# Patient Record
Sex: FEMALE | Race: WHITE | NOT HISPANIC OR LATINO | Employment: OTHER | URBAN - METROPOLITAN AREA
[De-identification: names, ages, dates, MRNs, and addresses within clinical notes are randomized per-mention and may not be internally consistent; named-entity substitution may affect disease eponyms.]

---

## 2023-02-09 PROBLEM — M99.09 SEGMENTAL AND SOMATIC DYSFUNCTION: Status: ACTIVE | Noted: 2023-02-09

## 2023-02-09 PROBLEM — E03.9 HYPOTHYROIDISM: Status: ACTIVE | Noted: 2023-02-09

## 2023-02-09 PROBLEM — E55.9 VITAMIN D DEFICIENCY: Status: ACTIVE | Noted: 2023-02-09

## 2023-02-09 PROBLEM — M54.2 DORSALGIA OF CERVICOTHORACIC REGION: Status: ACTIVE | Noted: 2023-02-09

## 2023-02-09 PROBLEM — H66.005 RECURRENT ACUTE SUPPURATIVE OTITIS MEDIA WITHOUT SPONTANEOUS RUPTURE OF LEFT TYMPANIC MEMBRANE: Status: ACTIVE | Noted: 2023-02-09

## 2023-02-09 PROBLEM — R39.9 SYMPTOMS INVOLVING URINARY SYSTEM: Status: ACTIVE | Noted: 2023-02-09

## 2023-02-09 PROBLEM — R25.2 MUSCLE CRAMPS: Status: ACTIVE | Noted: 2023-02-09

## 2023-02-09 PROBLEM — M54.31 SCIATICA OF RIGHT SIDE: Status: ACTIVE | Noted: 2023-02-09

## 2023-02-09 PROBLEM — R53.83 MALAISE AND FATIGUE: Status: ACTIVE | Noted: 2023-02-09

## 2023-02-09 PROBLEM — M53.3 SACROILIAC DYSFUNCTION: Status: ACTIVE | Noted: 2023-02-09

## 2023-02-09 PROBLEM — J01.10 ACUTE NON-RECURRENT FRONTAL SINUSITIS: Status: ACTIVE | Noted: 2023-02-09

## 2023-02-09 PROBLEM — R19.06 EPIGASTRIC MASS: Status: ACTIVE | Noted: 2023-02-09

## 2023-02-09 PROBLEM — R53.81 MALAISE AND FATIGUE: Status: ACTIVE | Noted: 2023-02-09

## 2023-02-09 PROBLEM — M54.6 DORSALGIA OF CERVICOTHORACIC REGION: Status: ACTIVE | Noted: 2023-02-09

## 2023-03-27 ENCOUNTER — TELEPHONE (OUTPATIENT)
Dept: PRIMARY CARE | Facility: CLINIC | Age: 73
End: 2023-03-27
Payer: COMMERCIAL

## 2023-04-03 ENCOUNTER — OFFICE VISIT (OUTPATIENT)
Dept: PRIMARY CARE | Facility: CLINIC | Age: 73
End: 2023-04-03
Payer: COMMERCIAL

## 2023-04-03 VITALS
WEIGHT: 126 LBS | SYSTOLIC BLOOD PRESSURE: 112 MMHG | DIASTOLIC BLOOD PRESSURE: 68 MMHG | TEMPERATURE: 97.4 F | HEART RATE: 65 BPM | OXYGEN SATURATION: 95 % | HEIGHT: 64 IN | BODY MASS INDEX: 21.51 KG/M2

## 2023-04-03 DIAGNOSIS — Z98.890 S/P BUNIONECTOMY: ICD-10-CM

## 2023-04-03 DIAGNOSIS — E03.9 HYPOTHYROIDISM, UNSPECIFIED TYPE: Primary | ICD-10-CM

## 2023-04-03 PROCEDURE — 99213 OFFICE O/P EST LOW 20 MIN: CPT | Performed by: FAMILY MEDICINE

## 2023-04-03 PROCEDURE — 1159F MED LIST DOCD IN RCRD: CPT | Performed by: FAMILY MEDICINE

## 2023-04-03 ASSESSMENT — PATIENT HEALTH QUESTIONNAIRE - PHQ9
SUM OF ALL RESPONSES TO PHQ9 QUESTIONS 1 AND 2: 0
2. FEELING DOWN, DEPRESSED OR HOPELESS: NOT AT ALL
1. LITTLE INTEREST OR PLEASURE IN DOING THINGS: NOT AT ALL

## 2023-04-03 NOTE — PROGRESS NOTES
Subjective   Patient ID: Hayley Morales is a 73 y.o. female who presents for Follow-up (Follow up on thyroid and check her right foot had a bunion taken off).    HPI  Having issues with the right foot.  Had bunionectomy and had fusion.  13 weeks out from surgery she is still swelling and burning.  Not stable. She is getting Therapy.  Not quickly enough.   broke his leg.  She is just back to work.  Ice and elevate.  When she has swelling it burns.        Review of Systems  No other complaints  Objective   Physical Exam  General: Patient is alert and oriented; no acute distress    Eyes: EOMI, PERRLA    Neck: No adenopathy, thyroid is palpable, normal size, no nodules    Heart: Regular rate and rhythm no murmurs clicks gallops    Lungs: Clear to auscultation bilateral without [place] wheezing    Extremities: No cyanosis clubbing or edema.  Well-healing scar on the right foot where the bunionectomy was done.  Clean dry and intact.  Assessment/Plan   Problem List Items Addressed This Visit    None  Bunionectomy  - Patient is healing well.  Instructed her to continue with current treatment  - Suggested that she try [employer]    Received her thyroid labs were normal let the patient know that there is any changes.  Otherwise she will follow-up in 6 months or as needed.

## 2023-04-06 ENCOUNTER — TELEPHONE (OUTPATIENT)
Dept: PRIMARY CARE | Facility: CLINIC | Age: 73
End: 2023-04-06
Payer: COMMERCIAL

## 2023-04-06 NOTE — TELEPHONE ENCOUNTER
Per Dr. Saldana labs done 3/30/23 let pt know overall results are normal. Would suggest to take selenium and zinc (OTC) to help with thyroid conversion of t4 to t3.

## 2023-04-06 NOTE — TELEPHONE ENCOUNTER
Called Hayley and let her know. DO YOU JUST WANT HER TO TAKE THOSE 1X DAILY OR DIFFERENTLY? SHE ASLO SAID SHE GOT [employer] AND ITS HELPING WITH THE SWELLING.

## 2023-09-11 ENCOUNTER — TELEPHONE (OUTPATIENT)
Dept: PRIMARY CARE | Facility: CLINIC | Age: 73
End: 2023-09-11
Payer: COMMERCIAL

## 2023-09-11 DIAGNOSIS — E55.9 VITAMIN D DEFICIENCY: ICD-10-CM

## 2023-09-11 DIAGNOSIS — R53.81 MALAISE AND FATIGUE: ICD-10-CM

## 2023-09-11 DIAGNOSIS — E03.9 HYPOTHYROIDISM, UNSPECIFIED TYPE: Primary | ICD-10-CM

## 2023-09-11 DIAGNOSIS — R53.83 MALAISE AND FATIGUE: ICD-10-CM

## 2023-10-02 ENCOUNTER — OFFICE VISIT (OUTPATIENT)
Dept: PRIMARY CARE | Facility: CLINIC | Age: 73
End: 2023-10-02
Payer: MEDICARE

## 2023-10-02 VITALS
BODY MASS INDEX: 19.81 KG/M2 | HEART RATE: 59 BPM | OXYGEN SATURATION: 99 % | HEIGHT: 64 IN | TEMPERATURE: 97.4 F | SYSTOLIC BLOOD PRESSURE: 128 MMHG | DIASTOLIC BLOOD PRESSURE: 66 MMHG | WEIGHT: 116 LBS

## 2023-10-02 DIAGNOSIS — Z12.11 COLON CANCER SCREENING: ICD-10-CM

## 2023-10-02 DIAGNOSIS — Z12.31 ENCOUNTER FOR SCREENING MAMMOGRAM FOR MALIGNANT NEOPLASM OF BREAST: ICD-10-CM

## 2023-10-02 DIAGNOSIS — Z00.00 WELL ADULT EXAM: Primary | ICD-10-CM

## 2023-10-02 PROCEDURE — 99397 PER PM REEVAL EST PAT 65+ YR: CPT | Performed by: FAMILY MEDICINE

## 2023-10-02 PROCEDURE — 1036F TOBACCO NON-USER: CPT | Performed by: FAMILY MEDICINE

## 2023-10-02 PROCEDURE — 1159F MED LIST DOCD IN RCRD: CPT | Performed by: FAMILY MEDICINE

## 2023-10-02 RX ORDER — FLUTICASONE PROPIONATE 50 MCG
SPRAY, SUSPENSION (ML) NASAL
COMMUNITY

## 2023-10-02 RX ORDER — ERGOCALCIFEROL 1.25 MG/1
CAPSULE ORAL
COMMUNITY

## 2023-10-02 ASSESSMENT — PATIENT HEALTH QUESTIONNAIRE - PHQ9
1. LITTLE INTEREST OR PLEASURE IN DOING THINGS: NOT AT ALL
2. FEELING DOWN, DEPRESSED OR HOPELESS: NOT AT ALL
SUM OF ALL RESPONSES TO PHQ9 QUESTIONS 1 AND 2: 0

## 2023-10-02 NOTE — PROGRESS NOTES
Subjective   Patient ID: Hayley Morales is a 73 y.o. female who presents for Follow-up (Right foot had surgery on it).  Well exam  HPI  Right foot is not doing well. She is still having pain.  It is buring all the time.      Lifestyle: She consumes a diverse and healthy diet. She exercises regularly. She does not use tobacco. She denies alcohol use.   Reproductive health: the patient is postmenopausal.   Cervical cancer screening: screening not indicated.   Breast cancer screening: cancer screening reviewed and current.   Colorectal Cancer Screening: colonoscopy ordered and cologuard neg 10/2021.   Metabolic screening: lipid profile performed within the past five years.   Review of Systems    Objective   Physical Exam  General: Patient is alert and oriented Ã--3 and appears in no acute distress. No respiratory distress.     Head: Atraumatic normocephalic.     Eyes: EOMI, PERRLA      Ears: Canals patent without any irritation, tympanic membranes without inflammation, no swelling, normal light reflex.     Nose: Nares patent. Turbinates are not swollen. No discharge.     Mouth: Normal mucosa. Moist. No erythema, exudates, tonsillar enlargement.     Neck: Normal range of motion, no masses. Thyroid is palpable and normal in size without any nodules. No anterior cervical or posterior cervical adenopathy.     Heart: Regular rate and rhythm, no murmurs clicks or gallops     Lungs: Clear to auscultation bilaterally without any rhonchi rales or wheezing, lung sounds heard throughout all lung fields  Assessment/Plan   Problem List Items Addressed This Visit    None  Visit Diagnoses       Well adult exam    -  Primary    Encounter for screening mammogram for malignant neoplasm of breast            Reviewed in for the patient's past medical history, surgical history, family history, social history  Depression screening was done in the office today using PHQ-2  We reviewed the patient's activities of daily living and possible  risk for falling. Patient is stable.  Discussed preventative screenings  Vaccinations were discussed in the office. We did review the patient's status on influenza vaccination, pneumonia vaccination, tetanus vaccination, and refuses  Patient was instructed to follow-up with dentist regularly and also with eye doctor regularly  We discussed advanced directives including power of , living well and DO NOT RESUSCITATE orders     hypothyroidism  - Controlled on Tirosint    Risk Factors Identified During Visit: None.   Influenza: the patient declines the influenza vaccination.   Pneumovax 23: the patient declines the Pneumovax vaccination.   Prevnar 13: the patient declines the Prevnar 13 vaccination.   Shingles Vaccine: the patient declines the Shingles vaccination.   Breast cancer screening: screening ordered.   Cervical cancer screening: screening not indicated.   Osteoporosis screening: screening ordered.   Colorectal cancer screening: cologuaambar 10/2021 neg  HIV screening: screening not indicated.

## 2023-10-23 DIAGNOSIS — E03.9 HYPOTHYROIDISM, UNSPECIFIED TYPE: Primary | ICD-10-CM

## 2023-10-23 RX ORDER — LEVOTHYROXINE SODIUM 75 UG/1
75 CAPSULE ORAL
Qty: 90 CAPSULE | Refills: 3 | Status: SHIPPED | OUTPATIENT
Start: 2023-10-23 | End: 2024-01-31 | Stop reason: SDUPTHER

## 2024-01-24 ENCOUNTER — TELEPHONE (OUTPATIENT)
Dept: PRIMARY CARE | Facility: CLINIC | Age: 74
End: 2024-01-24
Payer: COMMERCIAL

## 2024-01-31 ENCOUNTER — OFFICE VISIT (OUTPATIENT)
Dept: PRIMARY CARE | Facility: CLINIC | Age: 74
End: 2024-01-31
Payer: MEDICARE

## 2024-01-31 VITALS
WEIGHT: 116 LBS | HEIGHT: 64 IN | DIASTOLIC BLOOD PRESSURE: 62 MMHG | HEART RATE: 68 BPM | SYSTOLIC BLOOD PRESSURE: 106 MMHG | OXYGEN SATURATION: 98 % | RESPIRATION RATE: 16 BRPM | BODY MASS INDEX: 19.81 KG/M2

## 2024-01-31 DIAGNOSIS — J01.00 ACUTE NON-RECURRENT MAXILLARY SINUSITIS: Primary | ICD-10-CM

## 2024-01-31 DIAGNOSIS — E03.9 HYPOTHYROIDISM, UNSPECIFIED TYPE: ICD-10-CM

## 2024-01-31 PROBLEM — L57.0 SENILE HYPERKERATOSIS: Status: ACTIVE | Noted: 2024-01-31

## 2024-01-31 PROBLEM — D49.2 NEOPLASM OF SOFT TISSUE: Status: ACTIVE | Noted: 2024-01-31

## 2024-01-31 PROBLEM — B02.9 HERPES ZOSTER: Status: ACTIVE | Noted: 2024-01-31

## 2024-01-31 PROCEDURE — 1160F RVW MEDS BY RX/DR IN RCRD: CPT | Performed by: FAMILY MEDICINE

## 2024-01-31 PROCEDURE — 1159F MED LIST DOCD IN RCRD: CPT | Performed by: FAMILY MEDICINE

## 2024-01-31 PROCEDURE — 99213 OFFICE O/P EST LOW 20 MIN: CPT | Performed by: FAMILY MEDICINE

## 2024-01-31 PROCEDURE — 1036F TOBACCO NON-USER: CPT | Performed by: FAMILY MEDICINE

## 2024-01-31 RX ORDER — LEVOTHYROXINE SODIUM 75 UG/1
75 CAPSULE ORAL
Qty: 90 CAPSULE | Refills: 3 | Status: SHIPPED | OUTPATIENT
Start: 2024-01-31 | End: 2024-02-21 | Stop reason: SDUPTHER

## 2024-01-31 RX ORDER — AMOXICILLIN 875 MG/1
875 TABLET, FILM COATED ORAL 2 TIMES DAILY
Qty: 14 TABLET | Refills: 0 | Status: SHIPPED | OUTPATIENT
Start: 2024-01-31 | End: 2024-02-07

## 2024-01-31 NOTE — PROGRESS NOTES
"Subjective   Patient ID: Hayley Morales is a 73 y.o. female who presents for Cough (Congestion, fatigue).  HPI    Using saline rinses, allibiotic, vinegar and salt water gargles.  Mucinex    Hayley Morales is a 73 y.o. female here for evaluation of a cough. The cough is non-productive, without wheezing, dyspnea or hemoptysis and is aggravated by nothing. Onset of symptoms was 7 days ago, unchanged since that time. Associated symptoms include sputum production. Patient does not have a history of asthma. Patient has not had recent travel. Patient does not have a history of smoking. Patient has not had a previous chest x-ray. Patient has not had a PPD done.    Review of Systems     Objective   /62 (BP Location: Right arm, Patient Position: Sitting, BP Cuff Size: Adult)   Pulse 68   Resp 16   Ht 1.613 m (5' 3.5\")   Wt 52.6 kg (116 lb)   SpO2 98%   BMI 20.23 kg/m²      Physical Exam  General: Patient is alert and oriented ×3 and appears in no acute distress. No respiratory distress.  Appears sick    Head: Insert sick exam insertmaxillary sinus tenderness and frontal sinus tenderness    Eyes: EOMI, PERRLA.  No conjunctival injection    Ears: Canals patent without any irritation, tympanic membranes without inflammation, no swelling, normal light reflex.    Nose: Nares patent. Turbinates are swollen.  Clear discharge.    Mouth: Normal mucosa. Moist. No erythema, exudates, tonsillar enlargement.,  Drainage down the posterior pharynx    Neck: decreased range of motion, no masses.  No anterior cervical or posterior cervical adenopathy.    Heart: Regular rate and rhythm, no murmurs clicks or gallops    Lungs: Clear to auscultation bilaterally without any rhonchi rales or wheezing, lung sounds heard throughout all lung fields    Abdomen: Soft, nontender, no rigidity, rebound, guarding or organomegaly. Bowel sounds ×4 quadrants.    Skin: Intact, dry, no rashes or erythema    Assessment/Plan   Acute " Bronchitis/acute maxillary sinus tenderness    Explained lack of efficacy of antibiotics in viral disease.  Antitussives per medication orders.  Avoid exposure to tobacco smoke and fumes.  B-agonist inhaler.  Call if shortness of breath worsens, blood in sputum, change in character of cough, development of fever or chills, inability to maintain nutrition and hydration. Avoid exposure to tobacco smoke and fumes.          Drosera 30 C

## 2024-02-07 ENCOUNTER — OFFICE VISIT (OUTPATIENT)
Dept: PRIMARY CARE | Facility: CLINIC | Age: 74
End: 2024-02-07
Payer: COMMERCIAL

## 2024-02-07 VITALS
OXYGEN SATURATION: 96 % | HEART RATE: 56 BPM | WEIGHT: 116 LBS | BODY MASS INDEX: 19.81 KG/M2 | HEIGHT: 64 IN | SYSTOLIC BLOOD PRESSURE: 102 MMHG | DIASTOLIC BLOOD PRESSURE: 62 MMHG

## 2024-02-07 DIAGNOSIS — R05.9 COUGH, UNSPECIFIED TYPE: ICD-10-CM

## 2024-02-07 DIAGNOSIS — J01.00 ACUTE NON-RECURRENT MAXILLARY SINUSITIS: Primary | ICD-10-CM

## 2024-02-07 LAB
POC RAPID INFLUENZA A: NEGATIVE
POC RAPID INFLUENZA B: NEGATIVE

## 2024-02-07 PROCEDURE — 87804 INFLUENZA ASSAY W/OPTIC: CPT | Performed by: FAMILY MEDICINE

## 2024-02-07 PROCEDURE — 1159F MED LIST DOCD IN RCRD: CPT | Performed by: FAMILY MEDICINE

## 2024-02-07 PROCEDURE — 99213 OFFICE O/P EST LOW 20 MIN: CPT | Performed by: FAMILY MEDICINE

## 2024-02-07 PROCEDURE — 1036F TOBACCO NON-USER: CPT | Performed by: FAMILY MEDICINE

## 2024-02-07 PROCEDURE — 1160F RVW MEDS BY RX/DR IN RCRD: CPT | Performed by: FAMILY MEDICINE

## 2024-02-07 RX ORDER — AZITHROMYCIN 250 MG/1
TABLET, FILM COATED ORAL
Qty: 6 TABLET | Refills: 0 | Status: SHIPPED | OUTPATIENT
Start: 2024-02-07 | End: 2024-02-12

## 2024-02-07 RX ORDER — METHYLPREDNISOLONE 4 MG/1
TABLET ORAL
Qty: 21 TABLET | Refills: 0 | Status: SHIPPED | OUTPATIENT
Start: 2024-02-07 | End: 2024-02-14

## 2024-02-07 NOTE — PROGRESS NOTES
Subjective   Patient ID: Hayley Morales is a 73 y.o. female who presents for Cough (Ear pressure, finished amoxicillin yesterday).  HPI  Patient finished the amoxicillin.  Yesterday she coughed until she threw up.  She also has issues with her left ear being full.  She has had ruptured ear drum on the left in the past.    Review of Systems    Objective   Physical Exam  General: Patient is alert and orient x 3 appears in no acute distress.  She does appear sick and is wearing a mask    Head: Patient has pain to palpation of maxillary sinuses that is severe    Neck: No adenopathy    Heart: Regular rate and rhythm no murmurs clicks or gallops    Lungs: Clear to auscultation bilateral without [place] wheezing    Extremities: No cyanosis clubbing or edema      Assessment/Plan   Problem List Items Addressed This Visit    None  Visit Diagnoses       Acute non-recurrent maxillary sinusitis    -  Primary    Cough, unspecified type        Relevant Orders    POCT Influenza A/B manually resulted (Completed)          Instructed to take Bromaline 1000 mg up to 3 times a day with nothing to eat 1 hour before or after  Start Mucinex DM again  Make sure you are drinking plenty of water  Retreating with steroid and azithromycin  Call if symptoms or not improving  Make sure to take probiotics

## 2024-02-21 ENCOUNTER — TELEPHONE (OUTPATIENT)
Dept: PRIMARY CARE | Facility: CLINIC | Age: 74
End: 2024-02-21
Payer: COMMERCIAL

## 2024-02-21 DIAGNOSIS — E03.9 HYPOTHYROIDISM, UNSPECIFIED TYPE: ICD-10-CM

## 2024-02-21 RX ORDER — LEVOTHYROXINE SODIUM 75 UG/1
75 CAPSULE ORAL
Qty: 90 CAPSULE | Refills: 3 | Status: SHIPPED | OUTPATIENT
Start: 2024-02-21 | End: 2024-03-05 | Stop reason: SDUPTHER

## 2024-02-21 NOTE — TELEPHONE ENCOUNTER
Sharon called and said her 3 granddaughter that she watches have been exposed to chicken pox and she has had shingles. She is wanting to know if she can get them again or should she be concerned about it.

## 2024-03-05 DIAGNOSIS — E03.9 HYPOTHYROIDISM, UNSPECIFIED TYPE: ICD-10-CM

## 2024-03-05 RX ORDER — LEVOTHYROXINE SODIUM 75 UG/1
75 CAPSULE ORAL
Qty: 90 CAPSULE | Refills: 3 | Status: SHIPPED | OUTPATIENT
Start: 2024-03-05

## 2024-04-03 ENCOUNTER — APPOINTMENT (OUTPATIENT)
Dept: PRIMARY CARE | Facility: CLINIC | Age: 74
End: 2024-04-03
Payer: MEDICARE

## 2024-05-07 ENCOUNTER — OFFICE VISIT (OUTPATIENT)
Dept: PRIMARY CARE | Facility: CLINIC | Age: 74
End: 2024-05-07
Payer: COMMERCIAL

## 2024-05-07 VITALS
SYSTOLIC BLOOD PRESSURE: 130 MMHG | WEIGHT: 126 LBS | BODY MASS INDEX: 21.97 KG/M2 | DIASTOLIC BLOOD PRESSURE: 70 MMHG | OXYGEN SATURATION: 98 % | HEART RATE: 66 BPM

## 2024-05-07 DIAGNOSIS — E55.9 VITAMIN D DEFICIENCY: ICD-10-CM

## 2024-05-07 DIAGNOSIS — Z78.0 POSTMENOPAUSAL: ICD-10-CM

## 2024-05-07 DIAGNOSIS — Z12.11 COLON CANCER SCREENING: ICD-10-CM

## 2024-05-07 DIAGNOSIS — E03.8 OTHER SPECIFIED HYPOTHYROIDISM: Primary | ICD-10-CM

## 2024-05-07 DIAGNOSIS — M18.12 ARTHRITIS OF CARPOMETACARPAL (CMC) JOINT OF LEFT THUMB: ICD-10-CM

## 2024-05-07 PROCEDURE — 1159F MED LIST DOCD IN RCRD: CPT | Performed by: FAMILY MEDICINE

## 2024-05-07 PROCEDURE — 1160F RVW MEDS BY RX/DR IN RCRD: CPT | Performed by: FAMILY MEDICINE

## 2024-05-07 PROCEDURE — 99214 OFFICE O/P EST MOD 30 MIN: CPT | Performed by: FAMILY MEDICINE

## 2024-05-07 NOTE — PROGRESS NOTES
FSubjective   Patient ID: Hayley Morales is a 74 y.o. female who presents for f[o9l ollow up chronic issues and left wrist pain.   HPI  Follow up chronic issues and left wrist pain  Pain in the left arist at the CMC joint, no redness or swelling.  No particular trauma.  Review of Systems    Objective   Physical Exam  General: Patient is alert and oriented Ã--3 and appears in no acute distress. No respiratory distress.     Head: Atraumatic normocephalic.     Eyes: EOMI, PERRLA      Ears: Canals patent without any irritation, tympanic membranes without inflammation, no swelling, normal light reflex.     Nose: Nares patent. Turbinates are not swollen. No discharge.     Mouth: Normal mucosa. Moist. No erythema, exudates, tonsillar enlargement.     Neck: Normal range of motion, no masses. Thyroid is palpable and normal in size without any nodules. No anterior cervical or posterior cervical adenopathy.     Heart: Regular rate and rhythm, no murmurs clicks or gallops     Lungs: Clear to auscultation bilaterally without any rhonchi rales or wheezing, lung sounds heard throughout all lung fields    Musculoskeletal: Left CMC tender.        Assessment/Plan   Problem List Items Addressed This Visit       Hypothyroidism - Primary    Vitamin D deficiency     Other Visit Diagnoses       Postmenopausal        Colon cancer screening        Arthritis of carpometacarpal (CMC) joint of left thumb              hypothyroidism  - Controlled on Tirosint  - Eat 2 Brazil nuts daily    Carpal metacarpal arthritis left thumb  - Discussed following up with orthopedist and the patient states she wants to wait    Breast cancer screening: 10/26/23  Cervical cancer screening: screening not indicated.   Osteoporosis screening: screening ordered.   Colorectal cancer screening: korey 10/2021 neg.  Reordered

## 2024-05-13 ENCOUNTER — TELEPHONE (OUTPATIENT)
Dept: PRIMARY CARE | Facility: CLINIC | Age: 74
End: 2024-05-13
Payer: COMMERCIAL

## 2024-08-21 ENCOUNTER — TELEPHONE (OUTPATIENT)
Dept: PRIMARY CARE | Facility: CLINIC | Age: 74
End: 2024-08-21
Payer: COMMERCIAL

## 2024-08-21 DIAGNOSIS — E55.9 VITAMIN D DEFICIENCY: Primary | ICD-10-CM

## 2024-08-21 DIAGNOSIS — R53.81 MALAISE AND FATIGUE: ICD-10-CM

## 2024-08-21 DIAGNOSIS — R53.83 MALAISE AND FATIGUE: ICD-10-CM

## 2024-08-21 DIAGNOSIS — R25.2 MUSCLE CRAMPS: ICD-10-CM

## 2024-08-21 DIAGNOSIS — E03.8 OTHER SPECIFIED HYPOTHYROIDISM: ICD-10-CM

## 2024-08-21 NOTE — TELEPHONE ENCOUNTER
Hayley called asking for her lab orders to be sent to [facility]. She also asked if you wanted a different test because she is having cramps at night in her legs and feet. If you do let me know and I will fax all the orders to [facility] for her

## 2024-10-01 ENCOUNTER — TELEPHONE (OUTPATIENT)
Dept: PRIMARY CARE | Facility: CLINIC | Age: 74
End: 2024-10-01
Payer: COMMERCIAL

## 2024-10-01 LAB — NONINV COLON CA DNA+OCC BLD SCRN STL QL: NEGATIVE

## 2024-10-01 NOTE — TELEPHONE ENCOUNTER
----- Message from John Saldana sent at 10/1/2024  7:58 AM EDT -----  Please let patient know that cologuard was negative

## 2024-10-22 ENCOUNTER — APPOINTMENT (OUTPATIENT)
Dept: PRIMARY CARE | Facility: CLINIC | Age: 74
End: 2024-10-22
Payer: COMMERCIAL

## 2024-10-22 VITALS
HEART RATE: 61 BPM | SYSTOLIC BLOOD PRESSURE: 104 MMHG | BODY MASS INDEX: 20.92 KG/M2 | TEMPERATURE: 97.4 F | DIASTOLIC BLOOD PRESSURE: 62 MMHG | OXYGEN SATURATION: 99 % | WEIGHT: 120 LBS

## 2024-10-22 DIAGNOSIS — M79.672 LEFT FOOT PAIN: ICD-10-CM

## 2024-10-22 DIAGNOSIS — Z00.00 ANNUAL PHYSICAL EXAM: Primary | ICD-10-CM

## 2024-10-22 PROCEDURE — 99397 PER PM REEVAL EST PAT 65+ YR: CPT | Performed by: FAMILY MEDICINE

## 2024-10-22 PROCEDURE — 1159F MED LIST DOCD IN RCRD: CPT | Performed by: FAMILY MEDICINE

## 2024-10-22 PROCEDURE — 1158F ADVNC CARE PLAN TLK DOCD: CPT | Performed by: FAMILY MEDICINE

## 2024-10-22 PROCEDURE — 1160F RVW MEDS BY RX/DR IN RCRD: CPT | Performed by: FAMILY MEDICINE

## 2024-10-22 PROCEDURE — 1036F TOBACCO NON-USER: CPT | Performed by: FAMILY MEDICINE

## 2024-10-22 PROCEDURE — 1123F ACP DISCUSS/DSCN MKR DOCD: CPT | Performed by: FAMILY MEDICINE

## 2024-10-22 NOTE — PROGRESS NOTES
Subjective   Patient ID: Hayley Morales is a 74 y.o. female who presents for Follow-up (She rolled her left foot on acorns. /Go over labs).  Well exam  HPI  Left  foot pain after rolling her foot 1 week ago. She rolled it on acorns.  Had noticed bruising.  Pain is 3/10.  Walking makes it worse.  Using oregano topically    Lifestyle: She consumes a diverse and healthy diet. She exercises regularly. She does not use tobacco. She denies alcohol use.   Reproductive health: the patient is postmenopausal.   Cervical cancer screening: screening not indicated.   Breast cancer screening: cancer screening reviewed and current. Will have mammogram 10/2025  Colorectal Cancer Screening: colonoscopy ordered and cologuard neg 9/2024  Metabolic screening: lipid profile performed within the past five years.   Review of Systems    Objective   Physical Exam  General: Patient is alert and oriented and appears in no acute distress. No respiratory distress.     Head: Atraumatic normocephalic.     Eyes: EOMI, PERRLA      Ears: Canals patent without any irritation, tympanic membranes without inflammation, no swelling, normal light reflex.     Nose: Nares patent. Turbinates are not swollen. No discharge.     Mouth: Normal mucosa. Moist. No erythema, exudates, tonsillar enlargement.     Neck: Normal range of motion, no masses. Thyroid is palpable and normal in size without any nodules. No anterior cervical or posterior cervical adenopathy.     Heart: Regular rate and rhythm, no murmurs clicks or gallops     Lungs: Clear to auscultation bilaterally without any rhonchi rales or wheezing, lung sounds heard throughout all lung fields  Assessment/Plan   Problem List Items Addressed This Visit    None  Visit Diagnoses       Annual physical exam    -  Primary    Left foot pain        Relevant Orders    XR foot left 3+ views          Reviewed in for the patient's past medical history, surgical history, family history, social history  Depression  screening was done in the office today using PHQ-2  We reviewed the patient's activities of daily living and possible risk for falling. Patient is stable.  Discussed preventative screenings  Vaccinations were discussed in the office. We did review the patient's status on influenza vaccination, pneumonia vaccination, tetanus vaccination, and refuses  Patient was instructed to follow-up with dentist regularly and also with eye doctor regularly  We discussed advanced directives including power of , living well and DO NOT RESUSCITATE orders     hypothyroidism  - Controlled on Tirosint 75 mcg daily  - Thyroid is controlled as of 9/2024    Left foot pain   - xray left foot ordered    Risk Factors Identified During Visit: None.   Influenza: the patient declines the influenza vaccination.   Pneumovax 23: the patient declines the Pneumovax vaccination.   Prevnar 13: the patient declines the Prevnar 13 vaccination.   Shingles Vaccine: the patient declines the Shingles vaccination.   Breast cancer screening: screening ordered for 2025  Cervical cancer screening: screening not indicated.   Osteoporosis screening: screening ordered.   Colorectal cancer screening: cologuard 9/2024 neg  HIV screening: screening not indicated.

## 2024-12-30 DIAGNOSIS — E03.9 HYPOTHYROIDISM, UNSPECIFIED TYPE: ICD-10-CM

## 2024-12-30 RX ORDER — LEVOTHYROXINE SODIUM 75 UG/1
75 CAPSULE ORAL
Qty: 90 CAPSULE | Refills: 3 | Status: SHIPPED | OUTPATIENT
Start: 2024-12-30 | End: 2025-12-30

## 2025-04-23 ENCOUNTER — APPOINTMENT (OUTPATIENT)
Dept: PRIMARY CARE | Facility: CLINIC | Age: 75
End: 2025-04-23
Payer: COMMERCIAL

## 2025-04-23 VITALS
OXYGEN SATURATION: 98 % | TEMPERATURE: 97.4 F | HEIGHT: 64 IN | BODY MASS INDEX: 20.49 KG/M2 | HEART RATE: 53 BPM | DIASTOLIC BLOOD PRESSURE: 74 MMHG | WEIGHT: 120 LBS | SYSTOLIC BLOOD PRESSURE: 122 MMHG

## 2025-04-23 DIAGNOSIS — Z12.31 ENCOUNTER FOR SCREENING MAMMOGRAM FOR MALIGNANT NEOPLASM OF BREAST: Primary | ICD-10-CM

## 2025-04-23 DIAGNOSIS — Z78.0 POSTMENOPAUSAL: ICD-10-CM

## 2025-04-23 DIAGNOSIS — E03.9 HYPOTHYROIDISM, UNSPECIFIED TYPE: ICD-10-CM

## 2025-04-23 PROCEDURE — 1036F TOBACCO NON-USER: CPT | Performed by: FAMILY MEDICINE

## 2025-04-23 PROCEDURE — 3008F BODY MASS INDEX DOCD: CPT | Performed by: FAMILY MEDICINE

## 2025-04-23 PROCEDURE — 1159F MED LIST DOCD IN RCRD: CPT | Performed by: FAMILY MEDICINE

## 2025-04-23 PROCEDURE — 99214 OFFICE O/P EST MOD 30 MIN: CPT | Performed by: FAMILY MEDICINE

## 2025-04-23 PROCEDURE — 1123F ACP DISCUSS/DSCN MKR DOCD: CPT | Performed by: FAMILY MEDICINE

## 2025-04-23 RX ORDER — LEVOTHYROXINE SODIUM 75 UG/1
75 CAPSULE ORAL
Qty: 90 CAPSULE | Refills: 3 | Status: SHIPPED | OUTPATIENT
Start: 2025-04-23 | End: 2026-04-23

## 2025-04-23 ASSESSMENT — PATIENT HEALTH QUESTIONNAIRE - PHQ9
SUM OF ALL RESPONSES TO PHQ9 QUESTIONS 1 AND 2: 0
1. LITTLE INTEREST OR PLEASURE IN DOING THINGS: NOT AT ALL
2. FEELING DOWN, DEPRESSED OR HOPELESS: NOT AT ALL

## 2025-04-23 NOTE — PROGRESS NOTES
Subjective   Patient ID: Hayley Morales is a 75 y.o. female who presents for Follow-up (Go over labs/Can't sleep has been up since 3 am this morning/).  Well exam  HPI  Patient also followed up on chronic diseases today.  We reviewed her labs.  Labs were done April 18, 2025  White blood cells 4.0, hemoglobin 13.2, hematocrit 39.5, MCV 84.8, platelets 220.  Glucose 82, electrolytes normal, BUN 13, creatinine 0.85 with a GFR of 72  Magnesium 2  AST 17, ALT 21  B12 452  Vitamin D 39.1  TSH 1.23, free T41.28, free T32.32  Total cholesterol 188, triglycerides 34, HDL 96, LDL 85.    Lifestyle: She consumes a diverse and healthy diet. She exercises regularly. She does not use tobacco. She denies alcohol use.   Reproductive health: the patient is postmenopausal.   Cervical cancer screening: screening not indicated.   Breast cancer screening: cancer screening reviewed and current. Will have mammogram 10/2025  Colorectal Cancer Screening: colonoscopy ordered and cologuard neg 9/2024  Metabolic screening: lipid profile performed within the past five years.     Review of Systems    Objective   Physical Exam  General: Patient is alert and oriented and appears in no acute distress. No respiratory distress.     Head: Atraumatic normocephalic.     Eyes: EOMI, PERRLA      Ears: Canals patent without any irritation, tympanic membranes without inflammation, no swelling, normal light reflex.     Nose: Nares patent. Turbinates are not swollen. No discharge.     Mouth: Normal mucosa. Moist. No erythema, exudates, tonsillar enlargement.     Neck: Normal range of motion, no masses. Thyroid is palpable and normal in size without any nodules. No anterior cervical or posterior cervical adenopathy.     Heart: Regular rate and rhythm, no murmurs clicks or gallops     Lungs: Clear to auscultation bilaterally without any rhonchi rales or wheezing, lung sounds heard throughout all lung fields  Assessment/Plan   Problem List Items Addressed This  Visit       Hypothyroidism    Relevant Medications    Tirosint 75 mcg capsule     Other Visit Diagnoses         Encounter for screening mammogram for malignant neoplasm of breast    -  Primary    Relevant Orders    BI mammo bilateral screening tomosynthesis      Postmenopausal        Relevant Orders    XR DEXA bone density          Reviewed in for the patient's past medical history, surgical history, family history, social history  Depression screening was done in the office today using PHQ-2  We reviewed the patient's activities of daily living and possible risk for falling. Patient is stable.  Discussed preventative screenings  Vaccinations were discussed in the office. We did review the patient's status on influenza vaccination, pneumonia vaccination, tetanus vaccination, and refuses  Patient was instructed to follow-up with dentist regularly and also with eye doctor regularly  We discussed advanced directives including power of , living well and DO NOT RESUSCITATE orders     hypothyroidism  - Controlled on Tirosint 75 mcg daily  - Thyroid is controlled as of April 2025  - Suggested that she start eating 2 Brazil nuts a day to get her selenium and have a multivitamin with zinc    Reviewed all the labs no other changes needed    Risk Factors Identified During Visit: None.   Influenza: the patient declines the influenza vaccination.   Pneumovax 23: the patient declines the Pneumovax vaccination.   Prevnar 13: the patient declines the Prevnar 13 vaccination.   Shingles Vaccine: the patient declines the Shingles vaccination.   Breast cancer screening: screening ordered for 2025  Cervical cancer screening: screening not indicated.   Osteoporosis screening: screening ordered.   Colorectal cancer screening: cologshanelle 9/2024 neg  HIV screening: screening not indicated.

## 2025-10-23 ENCOUNTER — APPOINTMENT (OUTPATIENT)
Dept: PRIMARY CARE | Facility: CLINIC | Age: 75
End: 2025-10-23
Payer: COMMERCIAL